# Patient Record
Sex: MALE | Race: WHITE | NOT HISPANIC OR LATINO | Employment: OTHER | URBAN - METROPOLITAN AREA
[De-identification: names, ages, dates, MRNs, and addresses within clinical notes are randomized per-mention and may not be internally consistent; named-entity substitution may affect disease eponyms.]

---

## 2018-01-09 NOTE — MISCELLANEOUS
Message  GI Reminder Recall [facility]:   Date: 12/23/2016   Dear Abner Wade:     Review of our records shows you are due for the following: Colonoscopy  Please call the following office to schedule your appointment:   [address] [facility], [address], [place], [address] [place] [address]  [phone]  We look forward to hearing from you!      Sincerely,         [facility]  Dr Lucero Villegas    Electronically signed by : Manpreet Small, ; Dec 23 2016 12:55PM EST                       (Author)

## 2018-01-12 NOTE — MISCELLANEOUS
Message  GI Reminder Recall [facility]:   Date: 06/10/2016   Dear Yvonne Walsh:     Review of our records shows you are due for the following: EGD and Colonoscopy  Please call the following office to schedule your appointment:   [address] [facility], [place]  [address]  [phone]  We look forward to hearing from you! Sincerely,         Signatures   Electronically signed by :  Robert Rodgers, ; Derrick 10 2016  2:46PM EST                       (Author)

## 2024-02-07 ENCOUNTER — OFFICE VISIT (OUTPATIENT)
Dept: GASTROENTEROLOGY | Facility: CLINIC | Age: 64
End: 2024-02-07
Payer: COMMERCIAL

## 2024-02-07 ENCOUNTER — TELEPHONE (OUTPATIENT)
Dept: GASTROENTEROLOGY | Facility: CLINIC | Age: 64
End: 2024-02-07

## 2024-02-07 VITALS
HEIGHT: 69 IN | BODY MASS INDEX: 31.61 KG/M2 | SYSTOLIC BLOOD PRESSURE: 119 MMHG | HEART RATE: 75 BPM | DIASTOLIC BLOOD PRESSURE: 64 MMHG | WEIGHT: 213.4 LBS

## 2024-02-07 DIAGNOSIS — D50.0 IRON DEFICIENCY ANEMIA DUE TO CHRONIC BLOOD LOSS: Primary | ICD-10-CM

## 2024-02-07 DIAGNOSIS — K70.30 ALCOHOLIC CIRRHOSIS, UNSPECIFIED WHETHER ASCITES PRESENT (HCC): ICD-10-CM

## 2024-02-07 DIAGNOSIS — I50.9 CONGESTIVE HEART FAILURE, UNSPECIFIED HF CHRONICITY, UNSPECIFIED HEART FAILURE TYPE (HCC): ICD-10-CM

## 2024-02-07 PROCEDURE — 99204 OFFICE O/P NEW MOD 45 MIN: CPT | Performed by: INTERNAL MEDICINE

## 2024-02-07 RX ORDER — PANTOPRAZOLE SODIUM 40 MG/1
40 TABLET, DELAYED RELEASE ORAL DAILY
COMMUNITY
Start: 2024-01-09

## 2024-02-07 RX ORDER — METOPROLOL SUCCINATE 25 MG/1
25 TABLET, EXTENDED RELEASE ORAL DAILY
COMMUNITY
Start: 2024-02-05 | End: 2025-02-04

## 2024-02-07 RX ORDER — METOPROLOL SUCCINATE 50 MG/1
50 TABLET, EXTENDED RELEASE ORAL DAILY
COMMUNITY
Start: 2024-02-05 | End: 2025-02-04

## 2024-02-07 RX ORDER — POLYETHYLENE GLYCOL 3350, SODIUM SULFATE ANHYDROUS, SODIUM BICARBONATE, SODIUM CHLORIDE, POTASSIUM CHLORIDE 236; 22.74; 6.74; 5.86; 2.97 G/4L; G/4L; G/4L; G/4L; G/4L
4 POWDER, FOR SOLUTION ORAL ONCE
Qty: 4000 ML | Refills: 0 | Status: SHIPPED | OUTPATIENT
Start: 2024-02-07 | End: 2024-02-07

## 2024-02-07 RX ORDER — VALSARTAN 160 MG/1
160 TABLET ORAL DAILY
COMMUNITY
Start: 2024-01-16

## 2024-02-07 RX ORDER — FUROSEMIDE 20 MG/1
TABLET ORAL 3 TIMES DAILY
COMMUNITY
Start: 2024-01-09

## 2024-02-07 RX ORDER — LEVOTHYROXINE SODIUM 0.05 MG/1
50 TABLET ORAL DAILY
COMMUNITY
Start: 2024-02-01

## 2024-02-07 RX ORDER — ATORVASTATIN CALCIUM 10 MG/1
10 TABLET, FILM COATED ORAL DAILY
COMMUNITY
Start: 2024-02-06

## 2024-02-07 NOTE — TELEPHONE ENCOUNTER
Scheduled date of EGD/colonoscopy (as of today):02/26/24  Physician performing EGD/colonoscopy:Dr. Staley  Location of EGD/colonoscopy:[facility]  Desired bowel prep reviewed with patient:[place]  Instructions reviewed with patient by:ronak  Clearances:  n/a

## 2024-02-07 NOTE — PROGRESS NOTES
[facility] [place] - Outpatient Consultation  Jeancarlos Hardin 63 y.o. male MRN: [id number]  Encounter: [id number]          ASSESSMENT AND PLAN:      1. Iron deficiency anemia due to chronic blood loss  -     Colonoscopy; Future; Expected date: 02/07/2024  -     EGD; Future; Expected date: 02/07/2024  -     polyethylene glycol (Golytely) 4000 mL solution; Take 4,000 mL by mouth once for 1 dose Take according to instructions given by the office for colonoscopy bowel prep.  -     Fecal Globin By Immunochemistry; Future    2. Congestive heart failure, unspecified HF chronicity, unspecified heart failure type (HCC)    3. Alcoholic cirrhosis, unspecified whether ascites present (HCC)    Patient with history of anasarca, acute on chronic CHF with normal ejection fraction, responded to diuresis lost 32 pounds.  Imaging so far suggest cirrhosis, portal vein thrombosis, had evidence of iron deficiency with low iron saturation, platelets low normal at 160 K.    Since presentation combination of iron deficiency anemia, could be from GI blood loss and/or multifactorial, tells stool for occult blood, schedule EGD colonoscopy, procedure and prep discussed at length.    Incidental finding of pancreatic cyst 8 mm in size on ultrasound.    Portal vein thrombosis.    Last colonoscopy in 2011.    Follow-up in the office in 3 months.   Still has minimal edema of the feet.  Low-salt diet.  Staying from alcohol.  He has not had alcohol according to patient his wife for more than a month.  ______________________________________________________________________    HPI:      Patient was admitted to the hospital with generalized edema anasarca, found to have chronic congestive heart failure with preserved ejection fraction, echocardiogram revealed ejection fraction of 55 to 60% normal diastolic function BNP was elevated at 594.  He responded to diuresis low-salt diet, according to patient and his wife lost more than 30  pounds, the edema of the feet has become minimal, denies dysphagia coughing choking spells appetite is fair, denies any blood in stools melena hematochezia.  Was found to be anemic with evidence of iron deficiency microcytosis high RDW, no overt GI blood loss seen.  Does have history of fairly heavy alcohol use to 6 beers and some liquor every day for many many years.  Denies any dysphagia coughing chest spells bowels are irregular.  Diet medications more than 10 systems reviewed.    REVIEW OF SYSTEMS:    CONSTITUTIONAL: Denies any fever, chills, rigors, and weight loss.  HEENT: No earache or tinnitus.  CARDIOVASCULAR: No chest pain or palpitations.   RESPIRATORY: Denies any cough, hemoptysis, shortness of breath or dyspnea on exertion.  GASTROINTESTINAL: As noted in the History of Present Illness.   GENITOURINARY: Denies any hematuria or dysuria.  NEUROLOGIC: No dizziness or vertigo.   MUSCULOSKELETAL: Denies any joint swellings.  SKIN: Denies skin rashes or itching.   ENDOCRINE: Denies excessive thirst. Denies intolerance to heat or cold.  PSYCHOSOCIAL: Denies depression or anxiety. Denies any recent memory loss.       Historical Information   Past Medical History:   Diagnosis Date   • Colon polyp    • Hyperlipidemia    • Hypertension      Past Surgical History:   Procedure Laterality Date   • COLONOSCOPY     • HERNIA REPAIR     • UPPER GASTROINTESTINAL ENDOSCOPY       Social History   Social History     Substance and Sexual Activity   Alcohol Use Not Currently     Social History     Substance and Sexual Activity   Drug Use Never     Social History     Tobacco Use   Smoking Status Never   Smokeless Tobacco Never     History reviewed. No pertinent family history.    Meds/Allergies       Current Outpatient Medications:   •  atorvastatin (LIPITOR) 10 mg tablet  •  furosemide (LASIX) 20 mg tablet  •  levothyroxine 50 mcg tablet  •  metoprolol succinate (TOPROL-XL) 25 mg 24 hr tablet  •  metoprolol succinate  "(TOPROL-XL) 50 mg 24 hr tablet  •  pantoprazole (PROTONIX) 40 mg tablet  •  polyethylene glycol (Golytely) 4000 mL solution  •  valsartan (DIOVAN) 160 mg tablet    No Known Allergies        Objective     Blood pressure 119/64, pulse 75, height 5' 9\" (1.753 m), weight 96.8 kg (213 lb 6.4 oz). Body mass index is 31.51 kg/m².        PHYSICAL EXAM:      General Appearance:   Alert, cooperative, no distress   HEENT:   Normocephalic, atraumatic, anicteric.     Neck:  Supple, symmetrical, trachea midline   Lungs:   Clear to auscultation bilaterally; no rales, rhonchi or wheezing; respirations unlabored    Heart::   Regular rate and rhythm; no murmur.   Abdomen:   Soft, non-tender, non-distended; normal bowel sounds; no masses, no organomegaly    Genitalia:   Deferred    Rectal:   Deferred    Extremities:  No cyanosis, clubbing minimal/1+ edema of the ankles.   Skin:  No jaundice, rashes, or lesions    Lymph nodes:  No palpable cervical lymphadenopathy        Lab Results:   No visits with results within 1 Day(s) from this visit.   Latest known visit with results is:   No results found for any previous visit.         Radiology Results:   No results found.  "

## 2024-02-16 ENCOUNTER — TELEPHONE (OUTPATIENT)
Dept: GASTROENTEROLOGY | Facility: CLINIC | Age: 64
End: 2024-02-16

## 2024-02-22 ENCOUNTER — TELEPHONE (OUTPATIENT)
Dept: GASTROENTEROLOGY | Facility: CLINIC | Age: 64
End: 2024-02-22

## 2024-02-22 NOTE — TELEPHONE ENCOUNTER
Spoke to pt confirming pt's colonoscopy/egd scheduled on 2/26/24 at  with Dr Staley. Informed  would be calling  tomorrow with the arrival time.  Pt has instructions and did not have any questions.

## 2024-02-26 ENCOUNTER — ANESTHESIA (OUTPATIENT)
Dept: GASTROENTEROLOGY | Facility: HOSPITAL | Age: 64
End: 2024-02-26

## 2024-02-26 ENCOUNTER — HOSPITAL ENCOUNTER (OUTPATIENT)
Dept: GASTROENTEROLOGY | Facility: HOSPITAL | Age: 64
Setting detail: OUTPATIENT SURGERY
Discharge: HOME | End: 2024-02-26
Attending: INTERNAL MEDICINE
Payer: COMMERCIAL

## 2024-02-26 ENCOUNTER — ANESTHESIA EVENT (OUTPATIENT)
Dept: GASTROENTEROLOGY | Facility: HOSPITAL | Age: 64
End: 2024-02-26

## 2024-02-26 VITALS
WEIGHT: 210.7 LBS | BODY MASS INDEX: 30.16 KG/M2 | SYSTOLIC BLOOD PRESSURE: 110 MMHG | OXYGEN SATURATION: 97 % | TEMPERATURE: 97.5 F | RESPIRATION RATE: 13 BRPM | HEIGHT: 70 IN | DIASTOLIC BLOOD PRESSURE: 55 MMHG | HEART RATE: 61 BPM

## 2024-02-26 DIAGNOSIS — D50.0 IRON DEFICIENCY ANEMIA DUE TO CHRONIC BLOOD LOSS: ICD-10-CM

## 2024-02-26 PROBLEM — I10 HTN (HYPERTENSION): Status: ACTIVE | Noted: 2024-02-26

## 2024-02-26 PROBLEM — E03.9 HYPOTHYROIDISM: Status: ACTIVE | Noted: 2024-02-26

## 2024-02-26 PROBLEM — E78.5 HYPERLIPIDEMIA: Status: ACTIVE | Noted: 2024-02-26

## 2024-02-26 PROCEDURE — 88305 TISSUE EXAM BY PATHOLOGIST: CPT | Performed by: STUDENT IN AN ORGANIZED HEALTH CARE EDUCATION/TRAINING PROGRAM

## 2024-02-26 RX ORDER — LIDOCAINE HYDROCHLORIDE 10 MG/ML
INJECTION, SOLUTION EPIDURAL; INFILTRATION; INTRACAUDAL; PERINEURAL AS NEEDED
Status: DISCONTINUED | OUTPATIENT
Start: 2024-02-26 | End: 2024-02-26

## 2024-02-26 RX ORDER — SODIUM CHLORIDE, SODIUM LACTATE, POTASSIUM CHLORIDE, CALCIUM CHLORIDE 600; 310; 30; 20 MG/100ML; MG/100ML; MG/100ML; MG/100ML
50 INJECTION, SOLUTION INTRAVENOUS CONTINUOUS
Status: CANCELLED | OUTPATIENT
Start: 2024-02-26

## 2024-02-26 RX ORDER — SODIUM CHLORIDE, SODIUM LACTATE, POTASSIUM CHLORIDE, CALCIUM CHLORIDE 600; 310; 30; 20 MG/100ML; MG/100ML; MG/100ML; MG/100ML
INJECTION, SOLUTION INTRAVENOUS CONTINUOUS PRN
Status: DISCONTINUED | OUTPATIENT
Start: 2024-02-26 | End: 2024-02-26

## 2024-02-26 RX ORDER — EPHEDRINE SULFATE 50 MG/ML
INJECTION INTRAVENOUS AS NEEDED
Status: DISCONTINUED | OUTPATIENT
Start: 2024-02-26 | End: 2024-02-26

## 2024-02-26 RX ORDER — PROPOFOL 10 MG/ML
INJECTION, EMULSION INTRAVENOUS AS NEEDED
Status: DISCONTINUED | OUTPATIENT
Start: 2024-02-26 | End: 2024-02-26

## 2024-02-26 RX ADMIN — SODIUM CHLORIDE, SODIUM LACTATE, POTASSIUM CHLORIDE, AND CALCIUM CHLORIDE: .6; .31; .03; .02 INJECTION, SOLUTION INTRAVENOUS at 11:50

## 2024-02-26 RX ADMIN — LIDOCAINE HYDROCHLORIDE 50 MG: 10 INJECTION, SOLUTION EPIDURAL; INFILTRATION; INTRACAUDAL; PERINEURAL at 12:18

## 2024-02-26 RX ADMIN — PROPOFOL 100 MG: 10 INJECTION, EMULSION INTRAVENOUS at 12:18

## 2024-02-26 RX ADMIN — PROPOFOL 40 MG: 10 INJECTION, EMULSION INTRAVENOUS at 12:25

## 2024-02-26 RX ADMIN — EPHEDRINE SULFATE 10 MG: 50 INJECTION, SOLUTION INTRAVENOUS at 12:50

## 2024-02-26 RX ADMIN — PROPOFOL 150 MCG/KG/MIN: 10 INJECTION, EMULSION INTRAVENOUS at 12:19

## 2024-02-26 NOTE — ANESTHESIA PREPROCEDURE EVALUATION
"Procedure:  COLONOSCOPY  EGD    Relevant Problems   CARDIO   (+) HTN (hypertension)   (+) Hyperlipidemia      ENDO   (+) Hypothyroidism      HEMATOLOGY (within normal limits)      MUSCULOSKELETAL (within normal limits)      NEURO/PSYCH (within normal limits)        Physical Exam    Airway    Mallampati score: II  TM Distance: >3 FB  Neck ROM: full     Dental        Cardiovascular  Cardiovascular exam normal    Pulmonary  Pulmonary exam normal     Other Findings        Anesthesia Plan  ASA Score- 3     Anesthesia Type- IV sedation with anesthesia with ASA Monitors.         Additional Monitors:     Airway Plan:            Plan Factors-Exercise tolerance (METS): >4 METS.    Chart reviewed. EKG reviewed.  Existing labs reviewed. Patient summary reviewed.    Patient is not a current smoker. Patient not instructed to abstain from smoking on day of procedure. Patient did not smoke on day of surgery.            Induction-     Postoperative Plan-     Informed Consent- Anesthetic plan and risks discussed with patient and spouse.  I personally reviewed this patient with the CRNA. Discussed and agreed on the Anesthesia Plan with the CRNA..                Lab Results   Component Value Date    HGBA1C 6.7 (H) 01/06/2024       Lab Results   Component Value Date    K 4.4 02/20/2024     02/20/2024    CO2 23 02/20/2024    BUN 25 02/20/2024    CREATININE 2.42 (H) 02/20/2024    CALCIUM 9.7 02/20/2024    AST 34 01/09/2024    ALT 19 01/09/2024    ALKPHOS 125 (H) 01/09/2024    EGFR 29 (L) 02/20/2024       No results found for: \"WBC\", \"HGB\", \"HCT\", \"MCV\", \"PLT\"    Echo 2024     Left Ventricle: Left ventricle is normal in size. There is mild   concentric hypertrophy. Systolic function is normal with an ejection   fraction of 55-60%. Wall motion is within normal limits. There is normal   diastolic function.     Right Ventricle: Right ventricle cavity is normal. Systolic function is   normal.    Left Atrium: Left atrium cavity size is " normal.     Tricuspid Valve: There is trace regurgitation.     Pulmonic Valve: There is trace regurgitation.     IVC/SVC: The inferior vena cava demonstrates a diameter of <=21 mm and   collapses <50%; therefore, the right atrial pressure is estimated at 5-10   mmHg.

## 2024-02-26 NOTE — ANESTHESIA POSTPROCEDURE EVALUATION
Post-Op Assessment Note    CV Status:  Stable  Pain Score: 0    Pain management: adequate       Mental Status:  Alert and awake   Hydration Status:  Euvolemic   PONV Controlled:  Controlled   Airway Patency:  Patent     Post Op Vitals Reviewed: Yes    No anethesia notable event occurred.    Staff: CRNA               BP   103/54   Temp   97.6   Pulse  67   Resp   12   SpO2   95%

## 2024-02-27 ENCOUNTER — TELEPHONE (OUTPATIENT)
Age: 64
End: 2024-02-27

## 2024-02-27 NOTE — TELEPHONE ENCOUNTER
Patients GI provider:  Dr. Staley    Number to return call: [phone]    Reason for call: Pt's wife Sharon called stating Dr Staley requested pt be seen in 2 months. Pt's wife is requesting a sooner appointment.     Scheduled procedure/appointment date if applicable:  8/13/2024

## 2024-02-28 PROCEDURE — 88305 TISSUE EXAM BY PATHOLOGIST: CPT | Performed by: STUDENT IN AN ORGANIZED HEALTH CARE EDUCATION/TRAINING PROGRAM

## 2024-04-04 ENCOUNTER — OFFICE VISIT (OUTPATIENT)
Dept: GASTROENTEROLOGY | Facility: CLINIC | Age: 64
End: 2024-04-04
Payer: COMMERCIAL

## 2024-04-04 VITALS
SYSTOLIC BLOOD PRESSURE: 140 MMHG | HEIGHT: 70 IN | HEART RATE: 71 BPM | WEIGHT: 205.8 LBS | BODY MASS INDEX: 29.46 KG/M2 | DIASTOLIC BLOOD PRESSURE: 69 MMHG

## 2024-04-04 DIAGNOSIS — D50.0 IRON DEFICIENCY ANEMIA DUE TO CHRONIC BLOOD LOSS: ICD-10-CM

## 2024-04-04 DIAGNOSIS — D12.5 ADENOMA OF SIGMOID COLON: Primary | ICD-10-CM

## 2024-04-04 DIAGNOSIS — D69.6 THROMBOCYTOPENIA (HCC): ICD-10-CM

## 2024-04-04 DIAGNOSIS — K22.70 BARRETT'S ESOPHAGUS WITHOUT DYSPLASIA: ICD-10-CM

## 2024-04-04 DIAGNOSIS — K70.30 ALCOHOLIC CIRRHOSIS, UNSPECIFIED WHETHER ASCITES PRESENT (HCC): ICD-10-CM

## 2024-04-04 PROCEDURE — 99214 OFFICE O/P EST MOD 30 MIN: CPT | Performed by: INTERNAL MEDICINE

## 2024-04-04 RX ORDER — FERROUS SULFATE 325(65) MG
1 TABLET ORAL
COMMUNITY
Start: 2024-03-18

## 2024-04-04 NOTE — PROGRESS NOTES
[facility] [place] - Outpatient Follow-up Note  Jeancarlos Hardin 63 y.o. male MRN: [id number]  Encounter: [id number]          ASSESSMENT AND PLAN:      1. Adenoma of sigmoid colon    2. Alcoholic cirrhosis, unspecified whether ascites present (HCC)  -     Chronic Hepatitis Panel; Future  -     Hepatitis B surface antibody; Future  -     Hepatitis A antibody, total; Future  -     AFP tumor marker; Future  -     Comprehensive metabolic panel; Future  -     CBC and differential; Future  -     Gamma GT; Future  -     US abdomen complete; Future; Expected date: 04/04/2024  -     FL upper GI / small bowel with air; Future; Expected date: 04/04/2024    3. Iron deficiency anemia due to chronic blood loss  -     Chronic Hepatitis Panel; Future  -     Hepatitis B surface antibody; Future  -     Hepatitis A antibody, total; Future  -     AFP tumor marker; Future  -     Comprehensive metabolic panel; Future  -     CBC and differential; Future  -     Gamma GT; Future  -     US abdomen complete; Future; Expected date: 04/04/2024  -     FL upper GI / small bowel with air; Future; Expected date: 04/04/2024    4. Elizalde's esophagus without dysplasia    5. Thrombocytopenia (HCC)      Cirrhosis of the liver with thrombocytopenia platelets 134 , LASHAUN borderline though AMA ASMA negative.  Unclear etiology but most likely alcohol.  Hemoglobin stable around 10.6.  Iron deficiency anemia may be multifactorial partly from chronic GI blood loss.  Will also get a small bowel series for further evaluation of the same and if needed we will order video capsule endoscopy.     Alcoholic liver disease he is abstaining from alcohol and encouraged him to do the same, fatal complication discussed.  Repeat labs ultrasound in 6 months for surveillance and follow-up of the same.    Long segment Elizalde's, risk of cancer discussed, antireflux measure  reviewed.    ______________________________________________________________________    SUBJECTIVE:      Patient came in for follow-up of his history of liver disease and recent EGD colonoscopy for iron deficiency anemia and heme positive stool.  EGD had revealed gastritis: A large colon polyp was removed which was a tubulovillous adenoma with no dysplasia.  Since then he is doing fair.  Denies any apparent blood in stools melena hematochezia, denies any nausea vomiting, has lost some weight since he stopped drinking alcohol and watching his diet and salt.  Swelling of his feet is gone.  Was also found to have congestive heart failure and manages with cardiologist.  He was found to have cirrhosis of the liver and imaging studies, evaluation for any specific etiology has been generally unrevealing.  Diet medications more than 10 systems reviewed.      REVIEW OF SYSTEMS IS OTHERWISE NEGATIVE.      Historical Information   Past Medical History:   Diagnosis Date   • Colon polyp    • Hyperlipidemia    • Hypertension      Past Surgical History:   Procedure Laterality Date   • COLONOSCOPY     • HERNIA REPAIR     • UPPER GASTROINTESTINAL ENDOSCOPY       Social History   Social History     Substance and Sexual Activity   Alcohol Use Not Currently     Social History     Substance and Sexual Activity   Drug Use Never     Social History     Tobacco Use   Smoking Status Never   Smokeless Tobacco Never     History reviewed. No pertinent family history.    Meds/Allergies       Current Outpatient Medications:   •  atorvastatin (LIPITOR) 10 mg tablet  •  FeroSul 325 (65 Fe) MG tablet  •  furosemide (LASIX) 20 mg tablet  •  levothyroxine 50 mcg tablet  •  pantoprazole (PROTONIX) 40 mg tablet  •  valsartan (DIOVAN) 160 mg tablet  •  metoprolol succinate (TOPROL-XL) 25 mg 24 hr tablet  •  metoprolol succinate (TOPROL-XL) 50 mg 24 hr tablet  •  polyethylene glycol (Golytely) 4000 mL solution    No Known Allergies        Objective  "    Blood pressure 140/69, pulse 71, height 5' 10\" (1.778 m), weight 93.4 kg (205 lb 12.8 oz). Body mass index is 29.53 kg/m².      PHYSICAL EXAM:      General Appearance:   Alert, cooperative, no distress   HEENT:   Normocephalic, atraumatic, anicteric.     Neck:  Supple, symmetrical, trachea midline   Lungs:   Clear to auscultation bilaterally; no rales, rhonchi or wheezing; respirations unlabored    Heart::   Regular rate and rhythm; no murmur.   Abdomen:   Soft, non-tender, non-distended; normal bowel sounds; no masses, no organomegaly    Genitalia:   Deferred    Rectal:   Deferred    Extremities:  No cyanosis, clubbing or edema    Skin:  No jaundice, rashes, or lesions    Lymph nodes:  No palpable cervical lymphadenopathy        Lab Results:   No visits with results within 1 Day(s) from this visit.   Latest known visit with results is:   Hospital Outpatient Visit on 02/26/2024   Component Date Value   • Case Report 02/26/2024                      Value:Surgical Pathology Report                         Case: [id number]                                  Authorizing Provider:  Morgan Staley MD           Collected:           02/26/2024 1221              Ordering Location:     [facility]   Received:            02/26/2024 1500                                     Endoscopy                                                                    Pathologist:           Erick Kennedy MD                                                                           Specimens:   A) - Stomach, Cold Biopsy Antrum/ Errythema/ R/O H pylori                                           B) - Esophagus, Cold Biopsy Lower Esophagus @ 35                                                    C) - Esophagus, Cold Biopsy Lower Esophagus @ 32                                                    D) - Esophagogastric junction, Cold Snare Polypectomy GE " Junction Polyp                                                       E) - Polyp, Colorectal, Hot Snare Polypectomy Sigmoid Colon                               • Final Diagnosis 02/26/2024                      Value:This result contains rich text formatting which cannot be displayed here.   • Note 02/26/2024                      Value:This result contains rich text formatting which cannot be displayed here.   • Additional Information 02/26/2024                      Value:This result contains rich text formatting which cannot be displayed here.   • Synoptic Checklist 02/26/2024                      Value:                            COLON/RECTUM POLYP FORM - GI - E                                                                                     :    Adenoma(s)     • Gross Description 02/26/2024                      Value:This result contains rich text formatting which cannot be displayed here.   • Clinical Information 02/26/2024                      Value:FINDINGS:  · Irregular Z-line 38 cm from the incisors  · Small sliding hiatal hernia (type I hiatal hernia) - GE junction 38 cm from the incisors, diaphragmatic impression 39 cm from the incisors:  Hill classification: Grade I  · C5M8 Elizalde's esophagus observed with no associated lesion without using a distal attachment cap. The diaphragmatic impression was 39 cm from the incisors, Z-line was 35 cm from the incisors and the top of gastric folds was 38 cm from the incisors; performed cold forceps biopsy for dysplasia screening. Long segment of Elizalde's-like mucosa, multiple biopsies taken at 35 cm and 33 cm.  More than 10 specimens taken from these areas of the esophagus  · One Marichuy Isp polyp measuring 5-9 mm in the GE junction; performed cold snare removal and retrieved specimen. Polyp on a short thick stalk, GE junction, removed by snare.  Some bleeding seen which stopped spontaneously.  Could be reactive/inflammatory tissue.  · The duodenal bulb, 1st part of  the duodenum and 2nd                           part of the duodenum appeared normal.  · Mild, patchy abnormal mucosa with erosion in the antrum; performed cold forceps biopsy  · Multiple sessile polyps measuring 5-9 mm in the fundus of the stomach, body of the stomach and greater curve of the stomach. Multiple polyps in the stomach not removed.  · One 20 mm pedunculated Marichuy Ip polyp in the sigmoid colon; performed hot snare with complete en bloc removal and retrieved specimen; tattooed 1 cm distal to the finding with carbon black. At least 8 to 10 mm of stalk removed.  · Few small, scattered diverticula of mild severity in the transverse colon, descending colon and sigmoid colon  · Internal small (grade 1) hemorrhoids           Radiology Results:   No results found.

## 2024-09-03 LAB
AFP-TM SERPL-MCNC: 2.2 NG/ML
ALBUMIN SERPL-MCNC: 4.1 G/DL (ref 3.5–5.7)
ALP SERPL-CCNC: 136 U/L (ref 35–120)
ALT SERPL-CCNC: 35 U/L
ANION GAP SERPL CALCULATED.3IONS-SCNC: 12 MMOL/L (ref 3–11)
AST SERPL-CCNC: 37 U/L
BILIRUB SERPL-MCNC: 2.8 MG/DL (ref 0.2–1)
BUN SERPL-MCNC: 14 MG/DL (ref 7–28)
CALCIUM SERPL-MCNC: 9.9 MG/DL (ref 8.5–10.1)
CHLORIDE SERPL-SCNC: 99 MMOL/L (ref 100–109)
CO2 SERPL-SCNC: 22 MMOL/L (ref 21–31)
CREAT SERPL-MCNC: 0.98 MG/DL (ref 0.53–1.3)
CYTOLOGY CMNT CVX/VAG CYTO-IMP: ABNORMAL
GFR/BSA.PRED SERPLBLD CYS-BASED-ARV: 86 ML/MIN/{1.73_M2}
GGT SERPL-CCNC: 89 U/L (ref 9–64)
GLUCOSE SERPL-MCNC: 382 MG/DL (ref 65–99)
POTASSIUM SERPL-SCNC: 4.5 MMOL/L (ref 3.5–5.2)
PROT SERPL-MCNC: 7.1 G/DL (ref 6.3–8.3)
SODIUM SERPL-SCNC: 133 MMOL/L (ref 135–145)

## 2024-09-04 LAB
BASOPHILS # BLD AUTO: 0.1 THOU/CMM (ref 0–0.1)
BASOPHILS NFR BLD AUTO: 1 %
DIFFERENTIAL METHOD BLD: ABNORMAL
EOSINOPHIL # BLD AUTO: 0.1 THOU/CMM (ref 0–0.5)
EOSINOPHIL NFR BLD AUTO: 2 %
ERYTHROCYTE [DISTWIDTH] IN BLOOD BY AUTOMATED COUNT: 14.3 % (ref 12–16)
HAV AB SER-IMP: NORMAL
HAV IGG+IGM SER QL: NONREACTIVE
HAV IGM SERPL QL IA: NONREACTIVE
HBV CORE AB SERPL QL IA: NONREACTIVE
HBV CORE IGM SERPL QL IA: NONREACTIVE
HBV SURFACE AB SERPL IA-ACNC: >500 MIU/ML
HBV SURFACE AG SERPL QL IA: NONREACTIVE
HCT VFR BLD AUTO: 41.9 % (ref 37–48)
HCV AB SERPL QL IA: NONREACTIVE
HGB BLD-MCNC: 14.8 G/DL (ref 12.5–17)
LYMPHOCYTES # BLD AUTO: 1.6 THOU/CMM (ref 1–3)
LYMPHOCYTES NFR BLD AUTO: 20 %
MCH RBC QN AUTO: 32 PG (ref 27–36)
MCHC RBC AUTO-ENTMCNC: 35.3 G/DL (ref 32–37)
MCV RBC AUTO: 91 FL (ref 80–100)
MONOCYTES # BLD AUTO: 0.9 THOU/CMM (ref 0.3–1)
MONOCYTES NFR BLD AUTO: 10 %
NEUTROPHILS # BLD AUTO: 5.5 THOU/CMM (ref 1.8–7.8)
NEUTROPHILS NFR BLD AUTO: 67 %
PLATELET # BLD AUTO: 125 THOU/CMM (ref 140–350)
PMV BLD REES-ECKER: 12.1 FL (ref 7.5–11.3)
RBC # BLD AUTO: 4.63 MILL/CMM (ref 4–5.4)
REF LAB TEST REF RANGE: NORMAL
WBC # BLD AUTO: 8.3 THOU/CMM (ref 4–10.5)

## 2024-09-10 ENCOUNTER — HOSPITAL ENCOUNTER (OUTPATIENT)
Dept: ULTRASOUND IMAGING | Facility: HOSPITAL | Age: 64
Discharge: HOME | End: 2024-09-10
Attending: INTERNAL MEDICINE
Payer: COMMERCIAL

## 2024-09-10 ENCOUNTER — HOSPITAL ENCOUNTER (OUTPATIENT)
Dept: RADIOLOGY | Facility: HOSPITAL | Age: 64
Discharge: HOME | End: 2024-09-10
Attending: INTERNAL MEDICINE
Payer: COMMERCIAL

## 2024-09-10 DIAGNOSIS — K70.30 ALCOHOLIC CIRRHOSIS, UNSPECIFIED WHETHER ASCITES PRESENT (HCC): ICD-10-CM

## 2024-09-10 DIAGNOSIS — D50.0 IRON DEFICIENCY ANEMIA DUE TO CHRONIC BLOOD LOSS: ICD-10-CM

## 2024-09-10 PROCEDURE — 74246 X-RAY XM UPR GI TRC 2CNTRST: CPT

## 2024-09-10 PROCEDURE — 74248 X-RAY SM INT F-THRU STD: CPT

## 2024-09-10 PROCEDURE — 76700 US EXAM ABDOM COMPLETE: CPT

## 2024-09-12 NOTE — RESULT ENCOUNTER NOTE
Please call the patient regarding his abnormal result.  The patient's small bowel series shows small hiatal hernia, nothing very significant, hemoglobin is normalized, follow up in GI office as planned and suggested.  Can hold off any video capsule endoscopy study at this time.

## 2024-09-13 ENCOUNTER — TELEPHONE (OUTPATIENT)
Dept: GASTROENTEROLOGY | Facility: CLINIC | Age: 64
End: 2024-09-13

## 2024-09-13 NOTE — TELEPHONE ENCOUNTER
----- Message from Morgan Staley MD sent at 9/12/2024  3:27 PM EDT -----  Please call the patient regarding his abnormal result.  The patient's small bowel series shows small hiatal hernia, nothing very significant, hemoglobin is normalized, follow up in GI office as planned and suggested.  Can hold off any video capsule endoscopy study at this time.

## 2024-09-16 ENCOUNTER — OFFICE VISIT (OUTPATIENT)
Dept: GASTROENTEROLOGY | Facility: CLINIC | Age: 64
End: 2024-09-16
Payer: COMMERCIAL

## 2024-09-16 VITALS
HEIGHT: 70 IN | BODY MASS INDEX: 27.06 KG/M2 | SYSTOLIC BLOOD PRESSURE: 128 MMHG | HEART RATE: 69 BPM | WEIGHT: 189 LBS | DIASTOLIC BLOOD PRESSURE: 73 MMHG

## 2024-09-16 DIAGNOSIS — D12.5 ADENOMA OF SIGMOID COLON: ICD-10-CM

## 2024-09-16 DIAGNOSIS — K22.70 BARRETT'S ESOPHAGUS WITHOUT DYSPLASIA: ICD-10-CM

## 2024-09-16 DIAGNOSIS — K70.30 ALCOHOLIC CIRRHOSIS, UNSPECIFIED WHETHER ASCITES PRESENT (HCC): Primary | ICD-10-CM

## 2024-09-16 DIAGNOSIS — D69.6 THROMBOCYTOPENIA (HCC): ICD-10-CM

## 2024-09-16 DIAGNOSIS — R16.1 SPLENOMEGALY: ICD-10-CM

## 2024-09-16 DIAGNOSIS — I81 PORTAL VEIN THROMBOSIS: ICD-10-CM

## 2024-09-16 PROCEDURE — 99214 OFFICE O/P EST MOD 30 MIN: CPT | Performed by: INTERNAL MEDICINE

## 2024-09-16 RX ORDER — SPIRONOLACTONE 25 MG/1
25 TABLET ORAL DAILY
COMMUNITY
Start: 2024-05-06 | End: 2025-05-06

## 2024-09-16 NOTE — PROGRESS NOTES
[facility] [place] - Outpatient Follow-up Note  Jeancarlos Hardin 63 y.o. male MRN: [id number]  Encounter: [id number]          ASSESSMENT AND PLAN:      1. Alcoholic cirrhosis, unspecified whether ascites present (HCC)  2. Adenoma of sigmoid colon  3. Elizalde's esophagus without dysplasia  4. Thrombocytopenia (HCC)  5. Splenomegaly  6. Portal vein thrombosis    History of cirrhosis of the liver, thrombocytopenia splenomegaly, fatal complications of advanced liver disease reviewed, risk of hepatocellular carcinoma discussed.  Patient is abstinent of alcohol and I will continue to follow LFTs.  Recent labs and ultrasound otherwise noted.    History of Elizalde's esophagus Long segment, also adenomatous colon polyps large polyps, will be due for follow-up EGD colonoscopy in February, patient aware of follow-up EGD and colonoscopy.    Repeat labs in 6 months and follow-up in the office thereafter.  Order ultrasound next visit for surveillance.  He is working to control his blood sugar better.    ______________________________________________________________________    SUBJECTIVE:      Patient came in for evaluation of his liver disease, he has cirrhosis of the liver from alcohol with thrombocytopenia and splenomegaly, doing fair at this time, he denies any blood in stools melena hematochezia dysphagia coughing choking spells, appetite is fair weight stable.  He has stopped drinking alcohol completely.  Some of the current and prior labs were noted.  He does have occasional heartburn indigestion.  Some of the prior labs noted, denies any CVA stents pacemakers.  Does have some swelling of the feet.  Diet medication more than 10 systems reviewed.      REVIEW OF SYSTEMS IS OTHERWISE NEGATIVE.      Historical Information   Past Medical History:   Diagnosis Date    Colon polyp     Hyperlipidemia     Hypertension      Past Surgical History:   Procedure Laterality Date    COLONOSCOPY      HERNIA REPAIR       "UPPER GASTROINTESTINAL ENDOSCOPY       Social History   Social History     Substance and Sexual Activity   Alcohol Use Not Currently     Social History     Substance and Sexual Activity   Drug Use Never     Social History     Tobacco Use   Smoking Status Never   Smokeless Tobacco Never     History reviewed. No pertinent family history.    Meds/Allergies       Current Outpatient Medications:     atorvastatin (LIPITOR) 10 mg tablet    FeroSul 325 (65 Fe) MG tablet    furosemide (LASIX) 20 mg tablet    levothyroxine 50 mcg tablet    metoprolol succinate (TOPROL-XL) 25 mg 24 hr tablet    pantoprazole (PROTONIX) 40 mg tablet    spironolactone (ALDACTONE) 25 mg tablet    metoprolol succinate (TOPROL-XL) 50 mg 24 hr tablet    polyethylene glycol (Golytely) 4000 mL solution    valsartan (DIOVAN) 160 mg tablet    No Known Allergies        Objective     Blood pressure 128/73, pulse 69, height 5' 10\" (1.778 m), weight 85.7 kg (189 lb). Body mass index is 27.12 kg/m².      PHYSICAL EXAM:      General Appearance:   Alert, cooperative, no distress   HEENT:   Normocephalic, atraumatic, anicteric.     Neck:  Supple, symmetrical, trachea midline   Lungs:   Clear to auscultation bilaterally; no rales, rhonchi or wheezing; respirations unlabored    Heart::   Regular rate and rhythm; no murmur.   Abdomen:   Soft, non-tender, non-distended; normal bowel sounds; no masses, no organomegaly    Genitalia:   Deferred    Rectal:   Deferred    Extremities:  No cyanosis, clubbing or edema    Skin:  No jaundice, rashes, or lesions    Lymph nodes:  No palpable cervical lymphadenopathy        Lab Results:   No visits with results within 1 Day(s) from this visit.   Latest known visit with results is:   Orders Only on 09/03/2024   Component Date Value    AFP-Tumor Marker 09/03/2024 2.2     Glucose, Random 09/03/2024 382 (H)     BUN 09/03/2024 14     Creatinine 09/03/2024 0.98     Sodium 09/03/2024 133 (L)     Potassium 09/03/2024 4.5     Chloride " 09/03/2024 99 (L)     CO2 09/03/2024 22     Calcium 09/03/2024 9.9     Alkaline Phosphatase 09/03/2024 136 (H)     Albumin 09/03/2024 4.1     TOTAL BILIRUBIN 09/03/2024 2.8 (H)     Protein, Total 09/03/2024 7.1     AST 09/03/2024 37     ALT 09/03/2024 35     ANION GAP 09/03/2024 12 (H)     eGFR 09/03/2024 86     Comment 09/03/2024 (Note)     GGT 09/03/2024 89 (H)     Hemoglobin 09/03/2024 14.8     HCT 09/03/2024 41.9     White Blood Cell Count 09/03/2024 8.3     Red Blood Cell Count 09/03/2024 4.63     Platelet Count 09/03/2024 125 (L)     SL AMB MPV 09/03/2024 12.1 (H)     MCV 09/03/2024 91     MCH 09/03/2024 32.0     MCHC 09/03/2024 35.3     RDW 09/03/2024 14.3     Differential Type 09/03/2024 AUTO     Neutrophils (Absolute) 09/03/2024 5.5     Lymphocytes (Absolute) 09/03/2024 1.6     Monocytes (Absolute) 09/03/2024 0.9     Eosinophils (Absolute) 09/03/2024 0.1     Basophils ABS 09/03/2024 0.1     Neutrophils 09/03/2024 67     Lymphocytes 09/03/2024 20     Monocytes 09/03/2024 10     Eosinophils 09/03/2024 2     Basophils PCT 09/03/2024 1     Hepatitis A Antibody, To* 09/03/2024 Nonreactive     Hep A Ab, IgM 09/03/2024 Nonreactive     Hep A Total Ab Interp 09/03/2024 (Note)     HBsAg Screen 09/03/2024 Nonreactive     Hepatitis B Surface Anti* 09/03/2024 >500.0     Hep B Core Total Ab 09/03/2024 Nonreactive     Hep B Core Ab, IgM 09/03/2024 Nonreactive     HEP B TEST INFORMATION 09/03/2024 (Note)     HEP C AB 09/03/2024 Nonreactive          Radiology Results:   US abdomen complete    Result Date: 9/11/2024  Narrative: ABDOMEN ULTRASOUND, COMPLETE INDICATION: 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 COMPARISON: None TECHNIQUE: Real-time ultrasound of the abdomen was performed with a curvilinear transducer with both volumetric sweeps and still imaging techniques. FINDINGS: PANCREAS: Visualized portions of the pancreas are within normal  limits. AORTA AND IVC: Visualized portions are normal for patient age. LIVER: Size: Within normal range. The liver measures 13.9 cm in the midclavicular line. Contour: Surface contour is nodular. Parenchyma: There is diffuse coarsened heterogeneous echotexture suggesting underlying cirrhotic changes. No liver mass identified. Limited imaging of the main portal vein shows it to be patent and hepatopetal. BILIARY: No gallbladder findings. No intrahepatic biliary dilatation. CBD measures 3.0 mm. No choledocholithiasis. KIDNEY: Right kidney measures 10.0 x 6.3 x 5.4 cm. Volume 178.3 mL Kidney within normal limits. Left kidney measures 11.3 x 4.3 x 5.1 cm. Volume 128.4 mL Kidney within normal limits. SPLEEN: Measures 15.1 x 15.1 x 6.7 cm. Volume 800.6 mL No focal splenic mass or perisplenic fluid collection. ASCITES: None.     Impression: Cirrhotic morphology of the liver without focal mass identified. Splenomegaly. No acute intra-abdominal sonographic abnormality with findings detailed above. 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 COMPARISON: None TECHNIQUE:  view of the abdomen was obtained and is unremarkable. An upper GI series was then performed using effervescent granules and barium. Finally, a small bowel follow through was performed including spot images of the terminal ileum. IMAGES: 106 FLUOROSCOPY TIME: 6 min FINDINGS: Mildly patulous esophagus. Esophageal motility is normal and emptying of contrast from the esophagus is prompt. Tiny outpouching suggested at the level of the lower cervical esophagus suggesting a diverticulum. The stomach is unremarkable in size. Multiple small polyps identified at the level of the gastric body. Increased  gastric fold thickening. No definite ulcers. Contrast empties promptly into the duodenum. The duodenum is normal in caliber. The duodenojejunal junction is in its normal left upper quadrant location. Gastroesophageal reflux was not observed. Small hiatal hernia. Normal caliber small bowel loops. There is a normal fold pattern and thickness. Dense contrast obscures fine mucosal detail. No intraluminal filling defects, bowel kinking or fistula. Terminal ileum is unremarkable.     Impression: 1. Small hiatal hernia. Tiny diverticulum suggested of the lower cervical esophagus. 2. Multiple small gastric polyps. Increased gastric fold thickening which can be seen with gastritis. 3. Unremarkable small bowel follow-through. Workstation performed: [id number]

## 2025-03-06 LAB
AFP-TM SERPL-MCNC: 2.5 NG/ML
ALBUMIN SERPL-MCNC: 4.4 G/DL (ref 3.5–5.7)
ALP SERPL-CCNC: 170 U/L (ref 35–120)
ALT SERPL-CCNC: 42 U/L
ANION GAP SERPL CALCULATED.3IONS-SCNC: 11 MMOL/L (ref 3–11)
AST SERPL-CCNC: 48 U/L
BASOPHILS # BLD AUTO: 0.1 THOU/CMM (ref 0–0.1)
BASOPHILS NFR BLD AUTO: 1 %
BILIRUB DIRECT SERPL-MCNC: 0.6 MG/DL (ref 0–0.2)
BILIRUB SERPL-MCNC: 2.2 MG/DL (ref 0.2–1)
BUN SERPL-MCNC: 16 MG/DL (ref 7–28)
CALCIUM SERPL-MCNC: 9.6 MG/DL (ref 8.5–10.5)
CHLORIDE SERPL-SCNC: 106 MMOL/L (ref 100–109)
CO2 SERPL-SCNC: 22 MMOL/L (ref 21–31)
CREAT SERPL-MCNC: 0.92 MG/DL (ref 0.53–1.3)
CYTOLOGY CMNT CVX/VAG CYTO-IMP: ABNORMAL
DIFFERENTIAL METHOD BLD: ABNORMAL
EOSINOPHIL # BLD AUTO: 0.1 THOU/CMM (ref 0–0.5)
EOSINOPHIL NFR BLD AUTO: 2 %
ERYTHROCYTE [DISTWIDTH] IN BLOOD BY AUTOMATED COUNT: 14.3 % (ref 12–16)
GFR/BSA.PRED SERPLBLD CYS-BASED-ARV: 93 ML/MIN/{1.73_M2}
GLUCOSE SERPL-MCNC: 110 MG/DL (ref 65–99)
HCT VFR BLD AUTO: 41.3 % (ref 37–48)
HGB BLD-MCNC: 14.2 G/DL (ref 12.5–17)
INR PPP: 1.1
LYMPHOCYTES # BLD AUTO: 1.6 THOU/CMM (ref 1–3)
LYMPHOCYTES NFR BLD AUTO: 24 %
MCH RBC QN AUTO: 30.9 PG (ref 27–36)
MCHC RBC AUTO-ENTMCNC: 34.4 G/DL (ref 32–37)
MCV RBC AUTO: 90 FL (ref 80–100)
MONOCYTES # BLD AUTO: 1.1 THOU/CMM (ref 0.3–1)
MONOCYTES NFR BLD AUTO: 16 %
NEUTROPHILS # BLD AUTO: 3.9 THOU/CMM (ref 1.8–7.8)
NEUTROPHILS NFR BLD AUTO: 57 %
PLATELET # BLD AUTO: 156 THOU/CMM (ref 140–350)
PMV BLD REES-ECKER: 9.6 FL (ref 7.5–11.3)
POTASSIUM SERPL-SCNC: 4.1 MMOL/L (ref 3.5–5.2)
PROT SERPL-MCNC: 7.2 G/DL (ref 6.3–8.3)
PROTHROMBIN TIME: 14.7 SEC (ref 12–14.6)
RBC # BLD AUTO: 4.6 MILL/CMM (ref 4–5.4)
SODIUM SERPL-SCNC: 139 MMOL/L (ref 135–145)
WBC # BLD AUTO: 6.8 THOU/CMM (ref 4–10.5)

## 2025-03-20 ENCOUNTER — TELEPHONE (OUTPATIENT)
Dept: GASTROENTEROLOGY | Facility: CLINIC | Age: 65
End: 2025-03-20

## 2025-03-20 ENCOUNTER — OFFICE VISIT (OUTPATIENT)
Dept: GASTROENTEROLOGY | Facility: CLINIC | Age: 65
End: 2025-03-20
Payer: COMMERCIAL

## 2025-03-20 VITALS
WEIGHT: 194 LBS | SYSTOLIC BLOOD PRESSURE: 172 MMHG | HEIGHT: 70 IN | HEART RATE: 82 BPM | BODY MASS INDEX: 27.77 KG/M2 | DIASTOLIC BLOOD PRESSURE: 84 MMHG

## 2025-03-20 DIAGNOSIS — D12.5 ADENOMATOUS POLYP OF SIGMOID COLON: ICD-10-CM

## 2025-03-20 DIAGNOSIS — K70.30 ALCOHOLIC CIRRHOSIS, UNSPECIFIED WHETHER ASCITES PRESENT (HCC): Primary | ICD-10-CM

## 2025-03-20 DIAGNOSIS — K22.710 BARRETT'S ESOPHAGUS WITH LOW GRADE DYSPLASIA: ICD-10-CM

## 2025-03-20 DIAGNOSIS — R16.1 SPLENOMEGALY: ICD-10-CM

## 2025-03-20 DIAGNOSIS — D69.6 THROMBOCYTOPENIA (HCC): ICD-10-CM

## 2025-03-20 PROCEDURE — 99214 OFFICE O/P EST MOD 30 MIN: CPT | Performed by: INTERNAL MEDICINE

## 2025-03-20 RX ORDER — SODIUM CHLORIDE, SODIUM LACTATE, POTASSIUM CHLORIDE, CALCIUM CHLORIDE 600; 310; 30; 20 MG/100ML; MG/100ML; MG/100ML; MG/100ML
125 INJECTION, SOLUTION INTRAVENOUS CONTINUOUS
OUTPATIENT
Start: 2025-03-20

## 2025-03-20 RX ORDER — POLYETHYLENE GLYCOL 3350, SODIUM SULFATE ANHYDROUS, SODIUM BICARBONATE, SODIUM CHLORIDE, POTASSIUM CHLORIDE 236; 22.74; 6.74; 5.86; 2.97 G/4L; G/4L; G/4L; G/4L; G/4L
4 POWDER, FOR SOLUTION ORAL ONCE
Qty: 4000 ML | Refills: 0 | Status: SHIPPED | OUTPATIENT
Start: 2025-03-20 | End: 2025-03-20

## 2025-03-20 RX ORDER — METFORMIN HYDROCHLORIDE 750 MG/1
750 TABLET, EXTENDED RELEASE ORAL
COMMUNITY

## 2025-03-20 NOTE — PROGRESS NOTES
Name: Jeancarlos Hardin      : 1960      MRN: [id number]  Encounter Provider: Morgan Staley MD  Encounter Date: 3/20/2025   Encounter department: [facility] [place] [address]  :  Assessment & Plan  Alcoholic cirrhosis, unspecified whether ascites present (HCC)  History of cirrhosis of liver from alcohol, with with recent functional status, laboratory data and ultrasound results in the past reviewed AFP normal.  Patient well understands will complications of advanced liver disease/cirrhosis, risk of cancer.  Needs every 6-month surveillance with ultrasound and AFP.  Will order ultrasound.  Thereafter every 6 months labs will be ordered.  Low normal platelets.  His hepatitis A antibody negative, should have a vaccine for the same patient will contact his medical doctor for the same.  Encouraged to be up-to-date with her vaccinations like Shingrix and pneumonia as well.    Orders:  •  US abdomen limited; Future    Thrombocytopenia (HCC)         Splenomegaly         Elizalde's esophagus with low grade dysplasia  History of long splint of Elizalde's esophagus with low-grade dysplasia, will repeat EGD for further biopsies and surveillance if low-grade dysplasia persist then we will consider RFA ablation if patient is a candidate and no varices.  Orders:  •  EGD; Future    Adenomatous polyp of sigmoid colon  History of large tubulovillous adenomatous polyp 20 mm size, advanced polyp, need a follow-up for the same.  Procedure and prep discussed at length patient agrees schedule any consents.  Discussed with patient's wife as well.    Orders:  •  Colonoscopy; Future  •  polyethylene glycol (Golytely) 4000 mL solution; Take 4,000 mL by mouth once for 1 dose Take according to instructions given by the office for colonoscopy bowel prep.        History of Present Illness   HPI  Jeancarlos Hardin is a 64 y.o. male who presents with history of cirrhosis of the liver splenomegaly, heavy alcohol use,  "doing fair, though he did stop drinking a year ago but started to drink again, according to patient and his wife he has had few drinks here and there.  Appetite is fair weight stable denies any GI bleeding melena hematochezia chest pain shortness of breath fever chills rash, has occasional fatigue.  Denies dysphagia coughing choking spells excessive acid reflux indigestion.  Bowels are sometimes irregular but trying to consume more fluid and fiber intake and manage the bowels.  Denies any period of confusion no GI bleeding infections, generally in good stable health.  Has some fatigue.  Diet medications more than 10 pertinent systems some of the current and prior records noted.      Review of Systems       Objective   BP (!) 172/84 (BP Location: Left arm, Patient Position: Sitting, Cuff Size: Standard)   Pulse 82   Ht 5' 10\" (1.778 m)   Wt 88 kg (194 lb)   BMI 27.84 kg/m²      Physical Exam      "

## 2025-03-20 NOTE — TELEPHONE ENCOUNTER
Scheduled date of EGD/colonoscopy (as of today): 6/25/25  Physician performing EGD/colonoscopy: Dr Staley  Location of EGD/colonoscopy:   Desired bowel prep reviewed with patient: Mikey given at appt   Instructions reviewed with patient by: ls  Clearances:  n/a  Diabetic - Metformin

## 2025-04-01 ENCOUNTER — HOSPITAL ENCOUNTER (OUTPATIENT)
Dept: ULTRASOUND IMAGING | Facility: HOSPITAL | Age: 65
Discharge: HOME | End: 2025-04-01
Attending: INTERNAL MEDICINE
Payer: COMMERCIAL

## 2025-04-01 DIAGNOSIS — K70.30 ALCOHOLIC CIRRHOSIS OF LIVER WITHOUT ASCITES (HCC): ICD-10-CM

## 2025-04-01 DIAGNOSIS — K70.30 ALCOHOLIC CIRRHOSIS, UNSPECIFIED WHETHER ASCITES PRESENT (HCC): ICD-10-CM

## 2025-04-01 PROCEDURE — 76705 ECHO EXAM OF ABDOMEN: CPT

## 2025-04-01 PROCEDURE — 76981 USE PARENCHYMA: CPT

## 2025-04-04 ENCOUNTER — RESULTS FOLLOW-UP (OUTPATIENT)
Dept: GASTROENTEROLOGY | Facility: CLINIC | Age: 65
End: 2025-04-04

## 2025-04-04 DIAGNOSIS — K86.9 LESION OF PANCREAS: Primary | ICD-10-CM

## 2025-04-04 NOTE — RESULT ENCOUNTER NOTE
Please call the patient regarding his abnormal result.  MRI shows a small spot on the pancreas, could be just a scar tissue but MRI is recommended, patient should also to use contrast for MRI..  I placed an order for MRI MRCP and a BMP.  Ultrasound consistent with cirrhosis of the liver which we already know of, seems stable.

## 2025-04-07 ENCOUNTER — TELEPHONE (OUTPATIENT)
Age: 65
End: 2025-04-07

## 2025-04-07 NOTE — TELEPHONE ENCOUNTER
Stating she will be stopping be the office to  lab script, patient is to a BMP and they go HNL for blood work./Told a message would sent to notify / Spouse stated it will this afternoon when they stop by requesting script be left at .

## 2025-04-24 ENCOUNTER — HOSPITAL ENCOUNTER (OUTPATIENT)
Dept: MRI IMAGING | Facility: HOSPITAL | Age: 65
End: 2025-04-24
Attending: INTERNAL MEDICINE
Payer: COMMERCIAL

## 2025-04-24 DIAGNOSIS — K86.9 LESION OF PANCREAS: ICD-10-CM

## 2025-04-24 PROCEDURE — A9585 GADOBUTROL INJECTION: HCPCS | Performed by: INTERNAL MEDICINE

## 2025-04-24 PROCEDURE — 74183 MRI ABD W/O CNTR FLWD CNTR: CPT

## 2025-04-24 RX ORDER — GADOBUTROL 604.72 MG/ML
8 INJECTION INTRAVENOUS
Status: COMPLETED | OUTPATIENT
Start: 2025-04-24 | End: 2025-04-24

## 2025-04-24 RX ADMIN — GADOBUTROL 8 ML: 604.72 INJECTION INTRAVENOUS at 18:39

## 2025-05-01 ENCOUNTER — RESULTS FOLLOW-UP (OUTPATIENT)
Age: 65
End: 2025-05-01

## 2025-05-01 NOTE — RESULT ENCOUNTER NOTE
Please call the patient regarding his abnormal result.  MRI showed cyst in the pancreas which is rather small, nothing to worry about.  Will recommend follow-up MRI in 6 months time.  Can discuss this when he comes for an office visit for his liver disease as well.  Follow up in my office as needed

## 2025-05-06 NOTE — TELEPHONE ENCOUNTER
I lmom for pt to please call back to schedule a follow up office visit to the MRI, EGD/Colon (scheduled 6/25). Will call again if do not hear back from pt.

## 2025-05-14 ENCOUNTER — TELEPHONE (OUTPATIENT)
Age: 65
End: 2025-05-14

## 2025-05-14 NOTE — TELEPHONE ENCOUNTER
Patient spouse called in regarding a letter received from Insurance company from service on 4/1/2025  US elastography please contact patient as they need clinical info on why this was done and DX codes   Siena Hardin (Spouse)  [phone] (Mobile

## 2025-05-15 NOTE — TELEPHONE ENCOUNTER
I called patient and left message per consent. The diagnosis and diagnosis code for the US elastography is K70.30: Alcoholic cirrhosis of liver without ascites . Iadvised if any further questions or concerns to c/b.

## 2025-06-25 ENCOUNTER — ANESTHESIA EVENT (OUTPATIENT)
Dept: GASTROENTEROLOGY | Facility: HOSPITAL | Age: 65
End: 2025-06-25
Payer: COMMERCIAL

## 2025-06-25 ENCOUNTER — ANESTHESIA (OUTPATIENT)
Dept: GASTROENTEROLOGY | Facility: HOSPITAL | Age: 65
End: 2025-06-25
Payer: COMMERCIAL

## 2025-06-25 ENCOUNTER — HOSPITAL ENCOUNTER (OUTPATIENT)
Dept: GASTROENTEROLOGY | Facility: HOSPITAL | Age: 65
Setting detail: OUTPATIENT SURGERY
Discharge: HOME | End: 2025-06-25
Attending: INTERNAL MEDICINE
Payer: COMMERCIAL

## 2025-06-25 VITALS
DIASTOLIC BLOOD PRESSURE: 74 MMHG | BODY MASS INDEX: 28.07 KG/M2 | WEIGHT: 196.1 LBS | OXYGEN SATURATION: 95 % | TEMPERATURE: 98.1 F | RESPIRATION RATE: 15 BRPM | SYSTOLIC BLOOD PRESSURE: 113 MMHG | HEIGHT: 70 IN | HEART RATE: 96 BPM

## 2025-06-25 DIAGNOSIS — D12.5 ADENOMATOUS POLYP OF SIGMOID COLON: ICD-10-CM

## 2025-06-25 DIAGNOSIS — K22.710 BARRETT'S ESOPHAGUS WITH LOW GRADE DYSPLASIA: ICD-10-CM

## 2025-06-25 PROBLEM — I81 PORTAL VEIN THROMBOSIS: Status: ACTIVE | Noted: 2024-01-16

## 2025-06-25 PROBLEM — F10.21 ALCOHOL DEPENDENCE IN REMISSION (HCC): Status: ACTIVE | Noted: 2024-01-03

## 2025-06-25 PROBLEM — R79.89 ELEVATED LFTS: Status: ACTIVE | Noted: 2025-06-25

## 2025-06-25 PROBLEM — R73.03 PREDIABETES: Status: ACTIVE | Noted: 2019-08-28

## 2025-06-25 PROBLEM — E11.9 TYPE 2 DIABETES MELLITUS WITHOUT COMPLICATION, WITH LONG-TERM CURRENT USE OF INSULIN (HCC): Status: ACTIVE | Noted: 2024-08-26

## 2025-06-25 PROBLEM — Z79.4 TYPE 2 DIABETES MELLITUS WITHOUT COMPLICATION, WITH LONG-TERM CURRENT USE OF INSULIN (HCC): Status: ACTIVE | Noted: 2024-08-26

## 2025-06-25 PROBLEM — G47.33 OSA (OBSTRUCTIVE SLEEP APNEA): Status: ACTIVE | Noted: 2019-05-03

## 2025-06-25 PROBLEM — K21.9 GASTROESOPHAGEAL REFLUX DISEASE WITHOUT ESOPHAGITIS: Status: ACTIVE | Noted: 2018-12-21

## 2025-06-25 PROBLEM — Z96.652 S/P TKR (TOTAL KNEE REPLACEMENT) NOT USING CEMENT, LEFT: Status: ACTIVE | Noted: 2020-05-22

## 2025-06-25 PROBLEM — R35.1 NOCTURIA: Status: ACTIVE | Noted: 2024-07-23

## 2025-06-25 PROBLEM — D50.9 IRON DEFICIENCY ANEMIA: Status: ACTIVE | Noted: 2024-01-02

## 2025-06-25 PROBLEM — H91.92 ACQUIRED DEAFNESS OF LEFT EAR: Status: ACTIVE | Noted: 2020-06-10

## 2025-06-25 PROBLEM — K70.30 ALCOHOLIC CIRRHOSIS OF LIVER WITHOUT ASCITES (HCC): Status: ACTIVE | Noted: 2024-01-16

## 2025-06-25 LAB — GLUCOSE SERPL-MCNC: 118 MG/DL (ref 65–140)

## 2025-06-25 PROCEDURE — 45380 COLONOSCOPY AND BIOPSY: CPT | Performed by: INTERNAL MEDICINE

## 2025-06-25 PROCEDURE — 88313 SPECIAL STAINS GROUP 2: CPT | Performed by: PATHOLOGY

## 2025-06-25 PROCEDURE — 88305 TISSUE EXAM BY PATHOLOGIST: CPT | Performed by: PATHOLOGY

## 2025-06-25 PROCEDURE — 82948 REAGENT STRIP/BLOOD GLUCOSE: CPT

## 2025-06-25 PROCEDURE — 43239 EGD BIOPSY SINGLE/MULTIPLE: CPT | Performed by: INTERNAL MEDICINE

## 2025-06-25 PROCEDURE — 45385 COLONOSCOPY W/LESION REMOVAL: CPT | Performed by: INTERNAL MEDICINE

## 2025-06-25 PROCEDURE — 43251 EGD REMOVE LESION SNARE: CPT | Performed by: INTERNAL MEDICINE

## 2025-06-25 RX ORDER — PROPOFOL 10 MG/ML
INJECTION, EMULSION INTRAVENOUS AS NEEDED
Status: DISCONTINUED | OUTPATIENT
Start: 2025-06-25 | End: 2025-06-25

## 2025-06-25 RX ORDER — SODIUM CHLORIDE, SODIUM LACTATE, POTASSIUM CHLORIDE, CALCIUM CHLORIDE 600; 310; 30; 20 MG/100ML; MG/100ML; MG/100ML; MG/100ML
INJECTION, SOLUTION INTRAVENOUS CONTINUOUS PRN
Status: DISCONTINUED | OUTPATIENT
Start: 2025-06-25 | End: 2025-06-25

## 2025-06-25 RX ORDER — SIMETHICONE 40MG/0.6ML
SUSPENSION, DROPS(FINAL DOSAGE FORM)(ML) ORAL AS NEEDED
Status: COMPLETED | OUTPATIENT
Start: 2025-06-25 | End: 2025-06-25

## 2025-06-25 RX ORDER — LIDOCAINE HYDROCHLORIDE 20 MG/ML
INJECTION, SOLUTION EPIDURAL; INFILTRATION; INTRACAUDAL; PERINEURAL AS NEEDED
Status: DISCONTINUED | OUTPATIENT
Start: 2025-06-25 | End: 2025-06-25

## 2025-06-25 RX ORDER — SODIUM CHLORIDE, SODIUM LACTATE, POTASSIUM CHLORIDE, CALCIUM CHLORIDE 600; 310; 30; 20 MG/100ML; MG/100ML; MG/100ML; MG/100ML
125 INJECTION, SOLUTION INTRAVENOUS CONTINUOUS
Status: DISCONTINUED | OUTPATIENT
Start: 2025-06-25 | End: 2025-06-29 | Stop reason: HOSPADM

## 2025-06-25 RX ADMIN — PROPOFOL 50 MG: 10 INJECTION, EMULSION INTRAVENOUS at 07:49

## 2025-06-25 RX ADMIN — LIDOCAINE HYDROCHLORIDE 100 MG: 20 INJECTION, SOLUTION EPIDURAL; INFILTRATION; INTRACAUDAL; PERINEURAL at 07:37

## 2025-06-25 RX ADMIN — SODIUM CHLORIDE, SODIUM LACTATE, POTASSIUM CHLORIDE, AND CALCIUM CHLORIDE: .6; .31; .03; .02 INJECTION, SOLUTION INTRAVENOUS at 07:17

## 2025-06-25 RX ADMIN — PROPOFOL 150 MG: 10 INJECTION, EMULSION INTRAVENOUS at 07:37

## 2025-06-25 RX ADMIN — PROPOFOL 50 MG: 10 INJECTION, EMULSION INTRAVENOUS at 07:52

## 2025-06-25 RX ADMIN — Medication 40 MG: at 08:02

## 2025-06-25 RX ADMIN — PROPOFOL 50 MG: 10 INJECTION, EMULSION INTRAVENOUS at 07:55

## 2025-06-25 RX ADMIN — PROPOFOL 50 MG: 10 INJECTION, EMULSION INTRAVENOUS at 07:46

## 2025-06-25 RX ADMIN — PROPOFOL 25 MG: 10 INJECTION, EMULSION INTRAVENOUS at 08:07

## 2025-06-25 RX ADMIN — PROPOFOL 25 MG: 10 INJECTION, EMULSION INTRAVENOUS at 08:04

## 2025-06-25 RX ADMIN — PROPOFOL 25 MG: 10 INJECTION, EMULSION INTRAVENOUS at 08:01

## 2025-06-25 RX ADMIN — PROPOFOL 50 MG: 10 INJECTION, EMULSION INTRAVENOUS at 07:39

## 2025-06-25 RX ADMIN — PROPOFOL 50 MG: 10 INJECTION, EMULSION INTRAVENOUS at 07:41

## 2025-06-25 RX ADMIN — PROPOFOL 25 MG: 10 INJECTION, EMULSION INTRAVENOUS at 07:58

## 2025-06-25 RX ADMIN — PROPOFOL 50 MG: 10 INJECTION, EMULSION INTRAVENOUS at 07:43

## 2025-06-25 NOTE — H&P
"History and Physical -  [facility]  Jeancarlos Hardin 64 y.o. male MRN: [id number]                  HPI: Jeancarlos Hardin is a 64 y.o. year old male who presents for h/o gerd polyp      REVIEW OF SYSTEMS: Per the HPI, and otherwise unremarkable.    Historical Information   Past Medical History[1]  Past Surgical History[2]  Social History   Social History     Substance and Sexual Activity   Alcohol Use Not Currently     Social History     Substance and Sexual Activity   Drug Use Never     Tobacco Use History[3]  Family History[4]    Meds/Allergies     Current Medications[5]    Allergies[6]    Objective     /79   Pulse 83   Temp (!) 97.2 °F (36.2 °C) (Temporal)   Resp 16   Ht 5' 10\" (1.778 m)   Wt 89 kg (196 lb 1.6 oz)   SpO2 97%   BMI 28.14 kg/m²       PHYSICAL EXAM    Gen: NAD  Head: NCAT  CV: RRR  CHEST: Clear  ABD: soft, NT/ND  EXT: no edema      ASSESSMENT/PLAN:  This is a 64 y.o. year old male here for egd colon, and he is stable and optimized for his procedure.             [1]   Past Medical History:  Diagnosis Date    Colon polyp     Diabetes (HCC)     Hyperlipidemia     Hypertension    [2]   Past Surgical History:  Procedure Laterality Date    COLONOSCOPY      HERNIA REPAIR      UPPER GASTROINTESTINAL ENDOSCOPY     [3]   Social History  Tobacco Use   Smoking Status Never   Smokeless Tobacco Never   [4] No family history on file.  [5]   Current Outpatient Medications:     atorvastatin (LIPITOR) 10 mg tablet    FeroSul 325 (65 Fe) MG tablet    levothyroxine 50 mcg tablet    metFORMIN (GLUCOPHAGE-XR) 750 mg 24 hr tablet    metoprolol succinate (TOPROL-XL) 25 mg 24 hr tablet    furosemide (LASIX) 20 mg tablet    metoprolol succinate (TOPROL-XL) 50 mg 24 hr tablet    pantoprazole (PROTONIX) 40 mg tablet    polyethylene glycol (Golytely) 4000 mL solution    spironolactone (ALDACTONE) 25 mg tablet    valsartan (DIOVAN) 160 mg tablet    Current Facility-Administered Medications:     " lactated ringers infusion, 125 mL/hr, Intravenous, Continuous    Facility-Administered Medications Ordered in Other Encounters:     lactated ringers infusion, , Intravenous, Continuous PRN, New Bag at 06/25/25 0717  [6] No Known Allergies

## 2025-06-25 NOTE — ANESTHESIA POSTPROCEDURE EVALUATION
Post-Op Assessment Note    CV Status:  Stable  Pain Score: 0    Pain management: adequate       Mental Status:  Arousable and sleepy   Hydration Status:  Stable   PONV Controlled:  Controlled   Airway Patency:  Patent     Post Op Vitals Reviewed: Yes    No anethesia notable event occurred.    Staff: CRNA           Last Filed PACU Vitals:  Vitals Value Taken Time   Temp 98.1 °F (36.7 °C) 06/25/25 08:22   Pulse 82 06/25/25 08:22   BP 99/64 06/25/25 08:22   Resp 16 06/25/25 08:22   SpO2 95 % 06/25/25 08:22

## 2025-06-25 NOTE — ANESTHESIA PREPROCEDURE EVALUATION
Procedure:  COLONOSCOPY  EGD    Relevant Problems   CARDIO   (+) HTN (hypertension)   (+) Hyperlipidemia   (+) Portal vein thrombosis      ENDO   (+) Hypothyroidism   (+) Type 2 diabetes mellitus without complication, with long-term current use of insulin (HCC)      GI/HEPATIC   (+) Alcoholic cirrhosis of liver without ascites (HCC)   (+) Gastroesophageal reflux disease without esophagitis      HEMATOLOGY   (+) Iron deficiency anemia      MUSCULOSKELETAL   (+) Arthritis      PULMONARY   (+) WHITLEY (obstructive sleep apnea)      Behavioral Health   (+) Alcohol dependence in remission (HCC)      Ear/Nose/Throat   (+) Acquired deafness of left ear      Orthopedic/Musculoskeletal   (+) S/P TKR (total knee replacement) not using cement, left      FEN/Gastrointestinal   (+) Elizalde's esophagus with low grade dysplasia      Other   (+) Elevated LFTs   (+) Nocturia   (+) Prediabetes      Lab Results   Component Value Date    SODIUM 141 04/15/2025    K 3.8 04/15/2025     04/15/2025    CO2 22 04/15/2025    AGAP 12 (H) 04/15/2025    BUN 13 04/15/2025    CREATININE 0.89 04/15/2025    GLUC 129 (H) 04/15/2025    CALCIUM 9.7 04/15/2025    AST 59 (H) 04/15/2025    ALT 50 04/15/2025    ALKPHOS 173 (H) 04/15/2025    TP 7.4 04/15/2025    TBILI 1.7 (H) 04/15/2025    EGFR 95 04/15/2025     Lab Results   Component Value Date    WBC 6.8 03/06/2025    HGB 14.2 03/06/2025    HCT 41.3 03/06/2025    MCV 90 03/06/2025     03/06/2025       Physical Exam    Airway       TM Distance: >3 FB  Neck ROM: full      Cardiovascular  Rhythm: regular, Rate: normalCardiovascular exam normal    Dental   No notable dental hx     Pulmonary  Pulmonary exam normal Breath sounds clear to auscultation    Neurological      Other Findings        Anesthesia Plan  ASA Score- 3     Anesthesia Type- IV sedation with anesthesia with ASA Monitors.         Additional Monitors:     Airway Plan: natural airway.           Plan Factors-Exercise tolerance (METS): >4  METS.    Chart reviewed. EKG reviewed. Imaging results reviewed. Existing labs reviewed. Patient summary reviewed.                  Induction-     Postoperative Plan- .   Monitoring Plan - Monitoring plan - standard ASA monitoring          Informed Consent- Anesthetic plan and risks discussed with patient.  I personally reviewed this patient with the CRNA. Discussed and agreed on the Anesthesia Plan with the CRNA..      NPO Status:  Vitals Value Taken Time   Date of last liquid 06/25/25 06/25/25 07:01   Time of last liquid 0100 06/25/25 07:01   Date of last solid 06/23/25 06/25/25 07:01   Time of last solid 1900 06/25/25 07:01

## 2025-06-26 PROCEDURE — 88313 SPECIAL STAINS GROUP 2: CPT | Performed by: PATHOLOGY

## 2025-06-26 PROCEDURE — 88305 TISSUE EXAM BY PATHOLOGIST: CPT | Performed by: PATHOLOGY
